# Patient Record
(demographics unavailable — no encounter records)

---

## 2019-11-04 NOTE — PHYS DOC
Past Medical History


Past Medical History:  Bipolar, Migraines, UTI


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

10 years, PCOS,


Past Surgical History:  Other


Additional Past Surgical Histo:  Laparoscopy


Alcohol Use:  Heavy


Drug Use:  None





Adult General


Chief Complaint


Chief Complaint:  PAIN ON URINATION





HPI


HPI





Patient is a 21  year old female who presents with 3 days of bilateral flank 

pain. Patient rates her pain 8 out of 10. Patient states is her same exact 

symptoms that she always has when she has a urinary tract infection. She denies 

dysuria or vaginal discharge.





Review of Systems


Review of Systems








Musculoskeletal: Bilateral Flank back pain or joint pain []








All other systems were reviewed and found to be within normal limits, except as 

documented in this note.





Allergies


Allergies





Allergies








Coded Allergies Type Severity Reaction Last Updated Verified


 


  ibuprofen Allergy Intermediate  2/3/17 Yes


 


  sulfamethoxazole Allergy Intermediate  9/22/18 Yes


 


  trimethoprim Allergy Intermediate  9/22/18 Yes











Physical Exam


Physical Exam





Constitutional: Well developed, well nourished, no acute distress, non-toxic 

appearance. []


Cardiovascular:Heart rate regular rhythm, no murmur []


Lungs & Thorax:  Bilateral breath sounds clear to auscultation []


Abdomen: Bowel sounds normal, soft, no tenderness, no masses, no pulsatile 

masses. [] 


Skin: Warm, dry, no erythema, no rash. [] 


Back: No tenderness, Bilateral CVA tenderness. [] 


Neurologic: Alert and oriented X 3, normal motor function, normal sensory 

function, no focal deficits noted. []


Psychologic: Affect normal, judgement normal, mood normal. []





Current Patient Data


Vital Signs





                                   Vital Signs








  Date Time  Temp Pulse Resp B/P (MAP) Pulse Ox O2 Delivery O2 Flow Rate FiO2


 


11/4/19 17:21 98.6 78 16 157/74 (101) 98 Room Air  





 98.6       








Lab Values





                                Laboratory Tests








Test


 11/4/19


17:20 11/4/19


17:24


 


Urine Color Yellow   


 


Urine Clarity Cloudy   


 


Urine pH 6.0   


 


Urine Specific Gravity 1.025   


 


Urine Protein


 Negative mg/dL


(NEG-TRACE) 





 


Urine Glucose (UA)


 Negative mg/dL


(NEG) 





 


Urine Ketones (Stick)


 Negative mg/dL


(NEG) 





 


Urine Blood


 Negative (NEG)


 





 


Urine Nitrite


 Negative (NEG)


 





 


Urine Bilirubin


 Negative (NEG)


 





 


Urine Urobilinogen Dipstick


 0.2 mg/dL (0.2


mg/dL) 





 


Urine Leukocyte Esterase


 Moderate (NEG)


 





 


Urine RBC 0 /HPF (0-2)   


 


Urine WBC


 11-20 /HPF


(0-4) 





 


Urine Squamous Epithelial


Cells Occ /LPF  


 





 


Urine Amorphous Sediment Present /HPF   


 


Urine Bacteria


 Moderate /HPF


(0-FEW) 





 


Urine Mucus Mod /LPF   


 


POC Urine HCG, Qualitative


 


 Hcg negative


(Negative)











EKG


EKG


[]





Radiology/Procedures


Radiology/Procedures


[]





Course & Med Decision Making


Course & Med Decision Making


CVA tenderness bilaterally. Afebrile. Denies abdominal pain, nausea, vomiting, 

diarrhea, fever, dizziness, headache, hematuria, vaginal discharge, concerns for

 sexual transmitted disease. Nothing makes the pain worse or better. Patient 

states she is not taking anything to help with the pain. Alert and oriented. 

Abdomen soft and nontender. Skin pink warm and dry. Speaks in full clear 

sentences. Lungs are clear to auscultation all lobes.





Dragon Disclaimer


Dragon Disclaimer


This electronic medical record was generated, in whole or in part, using a voice

 recognition dictation system.





Departure


Departure


Impression:  


   Primary Impression:  


   UTI (urinary tract infection)


Disposition:  01 HOME, SELF-CARE


Condition:  STABLE


Referrals:  


NO PCP (PCP)


Patient Instructions:  Urinary Tract Infection





Additional Instructions:  


FOLLOW UP WITH PRIMARY CARE. TAKE MEDICATIONS AS PRESCRIBED. DRINK PLENTY OF 

FLUIDS.


Scripts


Cephalexin (KEFLEX) 500 Mg Capsule


1 CAP PO BID for 7 Days, #14 CAP 0 Refills


   Prov: CARLOS ALLAN         11/4/19





Problem Qualifiers








   Primary Impression:  


   UTI (urinary tract infection)


   Urinary tract infection type:  site unspecified  Hematuria presence:  without

    hematuria  Qualified Codes:  N39.0 - Urinary tract infection, site not 

   specified








CARLOS ALLAN             Nov 4, 2019 17:43

## 2019-11-22 NOTE — RAD
CHEST PA   LATERAL

 

History: Cough, fever. 

 

Comparison: Two-view chest October 30, 2017.

 

Findings: 

The cardiomediastinal silhouette is normal. Pulmonary vasculature is 

normal. Small linear opacity in the right middle lobe may be discoid 

atelectasis or scarring. The lungs are otherwise clear. No pleural 

effusion or pneumothorax is seen. There is no acute bone abnormality. 

 

IMPRESSION: 

No acute cardiopulmonary process. 

 

 

Electronically signed by: Chavez De Souza MD (11/22/2019 1:30 PM) [id number]

## 2019-11-22 NOTE — PHYS DOC
Past Medical History


Past Medical History:  Other


Additional Past Medical Histor:  POLYCYCTIC OVARIAN SYNDROM


Past Surgical History:  Other


Additional Past Surgical Histo:  Laparoscopy


Alcohol Use:  None


Drug Use:  None





Adult General


Chief Complaint


Chief Complaint:  Congestion





HPI


HPI





Patient is a 21  year old female who presents with multiple complaints. The 

patient's been having a cough, congestion, runny nose, fever of unknown 

severity, for 1 week. She rates her pain as 8 out of 10 in severity and sharp. 

The patient denies taking medications for this at home.





Review of Systems


Review of Systems





Constitutional: Reports fever or chills []


Eyes: Denies change in visual acuity, redness, or eye pain []


HENT: Reports nasal congestion and sore throat []


Respiratory: Reports cough.


Cardiovascular: No additional information not addressed in HPI []


GI: Reports abdominal pain, nausea, Denies vomiting, bloody stools or diarrhea 

[]


: Denies dysuria or hematuria []


Musculoskeletal: Denies back pain or joint pain []


Integument: Denies rash or skin lesions []


Neurologic: Denies headache, focal weakness or sensory changes []


Endocrine: Denies polyuria or polydipsia []





Complete systems were reviewed and found to be within normal limits, except as 

documented in this note.





Allergies


Allergies





Allergies








Coded Allergies Type Severity Reaction Last Updated Verified


 


  ibuprofen Allergy Intermediate  2/3/17 Yes


 


  sulfamethoxazole Allergy Intermediate  18 Yes


 


  trimethoprim Allergy Intermediate  18 Yes











Physical Exam


Physical Exam





Constitutional: Well developed, well nourished, no acute distress, non-toxic 

appearance. []


HENT: Normocephalic, atraumatic, bilateral external ears normal, oropharynx 

moist, no oral exudates, nose normal. []


Eyes: PERRLA, EOMI, conjunctiva normal, no discharge. [] 


Neck: Normal range of motion, no tenderness, supple, no stridor. [] 


Cardiovascular:Heart rate regular rhythm, no murmur []


Lungs & Thorax:  Bilateral breath sounds clear to auscultation []


Abdomen: Bowel sounds normal, soft, no tenderness, no masses, no pulsatile rea

s. [] 


Skin: Warm, dry, no erythema, no rash. [] 


Back: No tenderness, no CVA tenderness. [] 


Extremities: No tenderness, no cyanosis, no clubbing, ROM intact, no edema. [] 


Neurologic: Alert and oriented X 3, normal motor function, normal sensory 

function, no focal deficits noted. []


Psychologic: Affect normal, judgement normal, mood normal. []





Current Patient Data


Vital Signs





                                   Vital Signs








  Date Time  Temp Pulse Resp B/P (MAP) Pulse Ox O2 Delivery O2 Flow Rate FiO2


 


19 12:34 99.0 80 20 114/69 (84) 97 Room Air  





 99.0       








Lab Values





                                Laboratory Tests








Test


 19


12:50 19


12:51 19


12:55


 


Urine Collection Type Unknown    


 


Urine Color Yellow    


 


Urine Clarity Clear    


 


Urine pH 5.5    


 


Urine Specific Gravity 1.025    


 


Urine Protein


 Negative mg/dL


(NEG-TRACE) 


 





 


Urine Glucose (UA)


 Negative mg/dL


(NEG) 


 





 


Urine Ketones (Stick)


 Negative mg/dL


(NEG) 


 





 


Urine Blood


 Moderate (NEG)


 


 





 


Urine Nitrite


 Negative (NEG)


 


 





 


Urine Bilirubin


 Negative (NEG)


 


 





 


Urine Urobilinogen Dipstick


 0.2 mg/dL (0.2


mg/dL) 


 





 


Urine Leukocyte Esterase


 Moderate (NEG)


 


 





 


Urine RBC


 Rare /HPF


(0-2) 


 





 


Urine WBC


 5-10 /HPF


(0-4) 


 





 


Urine Squamous Epithelial


Cells Many /LPF  


 


 





 


Urine Bacteria


 Many /HPF


(0-FEW) 


 





 


Urine Mucus Marked /LPF    


 


POC Urine HCG, Qualitative


 


 Hcg negative


(Negative) 





 


Influenza Type A Antigen


 


 


 Negative


(NEGATIVE)


 


Influenza Type B Antigen


 


 


 Negative


(NEGATIVE)











EKG


EKG


[]





Radiology/Procedures


Radiology/Procedures


[][facility]


                    [address] [place] [address]


                                 [phone]


                                        


                                 IMAGING REPORT





                                     Signed





PATIENT: REEMA ALMODOVAR     ACCOUNT: [id number]     MRN#: [id number]


: 1998           LOCATION: ER              AGE: 21


SEX: F                    EXAM DT: 19         ACCESSION#: [id number]


STATUS: REG ER            ORD. PHYSICIAN: BAUTISTA WEST


REASON: cough, fever


PROCEDURE: CHEST PA & LATERAL





CHEST PA   LATERAL


 


History: Cough, fever. 


 


Comparison: Two-view chest 2017.


 


Findings: 


The cardiomediastinal silhouette is normal. Pulmonary vasculature is 


normal. Small linear opacity in the right middle lobe may be discoid 


atelectasis or scarring. The lungs are otherwise clear. No pleural 


effusion or pneumothorax is seen. There is no acute bone abnormality. 


 


IMPRESSION: 


No acute cardiopulmonary process. 


 


 


Electronically signed by: Chavez Jacobson MD (2019 1:30 PM) [id number]














DICTATED and SIGNED BY:     CHAVEZ JACOBSON MD


DATE:     19 6610





Course & Med Decision Making


Course & Med Decision Making


Pertinent Labs and Imaging studies reviewed. (See chart for details)





Will get chest xray, ua, preg, and flu.





Flu is negative, chest x-ray is unremarkable.





Dragon Disclaimer


Dragon Disclaimer


This electronic medical record was generated, in whole or in part, using a voice

 recognition dictation system.





Departure


Departure


Impression:  


   Primary Impression:  


   UTI (urinary tract infection)


   Additional Impression:  


   Upper respiratory infection, viral


Disposition:   HOME, SELF-CARE


Condition:  STABLE


Referrals:  


NO PCP (PCP)


Patient Instructions:  Upper Respiratory Infection, Adult, Urinary Tract 

Infection





Additional Instructions:  


Thank you for visiting [facility]. We appreciate you trusting us 

with your care. If any additional problems come up don't hesitate to return to 

visit us. Please follow up with your primary care provider so they can plan 

additional care if needed and know about the problem that you had. If symptoms 

worsen come back to the Emergency Department. Any concerning symptoms that start

 such as chest pain, shortness of air, weakness or numbness on one side of the 

body, running high fevers or any other concerning symptoms return to the ER.





You have been prescribed an antibiotic today to help fight your infection. 

Please take all of the antibiotic as directed. If after 48 hours the infection 

is not improving, please return for more care. If the infection worsens, return 

to ER for additional care.


Scripts


Cephalexin (KEFLEX) 500 Mg Capsule


1 CAP PO BID for 7 Days, #14 CAP 0 Refills


   Prov: BAUTISTA WEST         19





Problem Qualifiers








   Primary Impression:  


   UTI (urinary tract infection)


   Urinary tract infection type:  acute cystitis  Hematuria presence:  without 

   hematuria  Qualified Codes:  N30.00 - Acute cystitis without hematuria








BAUTISTA WEST          2019 13:02

## 2020-07-02 NOTE — PHYS DOC
Past Medical History


Past Medical History:  Other


Additional Past Medical Histor:  POLYCYCTIC OVARIAN SYNDROM


Past Surgical History:  Other


Additional Past Surgical Histo:  Laparoscopy


Smoking Status:  Never Smoker


Alcohol Use:  None


Drug Use:  None





General Adult


EDM:


Chief Complaint:  ABDOMINAL PAIN





HPI:


HPI:





22-year-old female presents with a chief complaint of right lower quadrant 

abdominal pain.  Patient states abdominal pain was present upon awakening at 

noon.  Patient states pain is in the right lower quadrant and it does not 

radiate.  Patient states she has associated nausea but has not vomited.  Patient

denies any diarrhea she denies any urinary symptoms.





Review of Systems:


Review of Systems:


Constitutional:   Denies fever or chills. []


Eyes:   Denies change in visual acuity. []


HENT:   Denies nasal congestion or sore throat. [] 


Respiratory:   Denies cough or shortness of breath. [] 


Cardiovascular:   Denies chest pain or edema. [] 


GI:   Positive abdominal pain positive nausea


:  Denies dysuria. [] 


Musculoskeletal:   Denies back pain or joint pain. [] 


Integument:   Denies rash. [] 


Neurologic:   Denies headache, focal weakness or sensory changes. [] 


Endocrine:   Denies polyuria or polydipsia. [] 


Lymphatic:  Denies swollen glands. [] 


Psychiatric:  Denies depression or anxiety. []





Heart Score:


Risk Factors:


Risk Factors:  DM, Current or recent (<one month) smoker, HTN, HLP, family 

history of CAD, obesity.


Risk Scores:


Score 0 - 3:  2.5% MACE over next 6 weeks - Discharge Home


Score 4 - 6:  20.3% MACE over next 6 weeks - Admit for Clinical Observation


Score 7 - 10:  72.7% MACE over next 6 weeks - Early Invasive Strategies





Allergies:


Allergies:





Allergies








Coded Allergies Type Severity Reaction Last Updated Verified


 


  ibuprofen Allergy Intermediate  2/3/17 Yes


 


  sulfamethoxazole Allergy Intermediate  9/22/18 Yes


 


  trimethoprim Allergy Intermediate  9/22/18 Yes











Physical Exam:


PE:





Constitutional: Well developed, well nourished, no acute distress, non-toxic 

appearance. []


HENT: Normocephalic, atraumatic, bilateral external ears normal, oropharynx 

moist, no oral exudates, nose normal. []


Eyes: EOMI, conjunctiva normal, no discharge. [] 


Neck: Normal range of motion, no tenderness, supple, no stridor. [] 


Cardiovascular:Heart rate regular rhythm, no murmur []


Lungs & Thorax: No respiratory distress


Abdomen: Tenderness to palpation right lower quadrant


Skin: Warm, dry, no erythema, no rash. [] 


Back: No tenderness, no CVA tenderness. [] 


Extremities: No tenderness, no cyanosis, no clubbing, ROM intact, no edema. [] 


Neurologic: Alert and oriented X 3, normal motor function, normal sensory 

function, no focal deficits noted. []


Psychologic: Affect normal, judgement normal, mood normal. []





Current Patient Data:


Labs:





                                Laboratory Tests








Test


 7/2/20


01:05 7/2/20


01:15 7/2/20


01:26


 


Urine Collection Type Unknown    


 


Urine Color Yellow    


 


Urine Clarity Clear    


 


Urine pH


 5.5 (<5.0-8.0)


 


 





 


Urine Specific Gravity


 1.020


(1.000-1.030) 


 





 


Urine Protein


 Negative mg/dL


(NEG-TRACE) 


 





 


Urine Glucose (UA)


 Negative mg/dL


(NEG) 


 





 


Urine Ketones (Stick)


 Negative mg/dL


(NEG) 


 





 


Urine Blood


 Negative (NEG)


 


 





 


Urine Nitrite


 Negative (NEG)


 


 





 


Urine Bilirubin


 Negative (NEG)


 


 





 


Urine Urobilinogen Dipstick


 0.2 mg/dL (0.2


mg/dL) 


 





 


Urine Leukocyte Esterase Trace (NEG)    


 


Urine RBC 0 /HPF (0-2)    


 


Urine WBC


 5-10 /HPF


(0-4) 


 





 


Urine Squamous Epithelial


Cells Mod /LPF  


 


 





 


Urine Bacteria


 Few /HPF


(0-FEW) 


 





 


Urine Mucus Slight /LPF    


 


White Blood Count


 


 8.9 x10^3/uL


(4.0-11.0) 





 


Red Blood Count


 


 4.04 x10^6/uL


(3.50-5.40) 





 


Hemoglobin


 


 12.3 g/dL


(12.0-15.5) 





 


Hematocrit


 


 36.5 %


(36.0-47.0) 





 


Mean Corpuscular Volume


 


 90 fL ()


 





 


Mean Corpuscular Hemoglobin  30 pg (25-35)   


 


Mean Corpuscular Hemoglobin


Concent 


 34 g/dL


(31-37) 





 


Red Cell Distribution Width


 


 13.8 %


(11.5-14.5) 





 


Platelet Count


 


 308 x10^3/uL


(140-400) 





 


Neutrophils (%) (Auto)  66 % (31-73)   


 


Lymphocytes (%) (Auto)  25 % (24-48)   


 


Monocytes (%) (Auto)  6 % (0-9)   


 


Eosinophils (%) (Auto)  2 % (0-3)   


 


Basophils (%) (Auto)  1 % (0-3)   


 


Neutrophils # (Auto)


 


 5.9 x10^3/uL


(1.8-7.7) 





 


Lymphocytes # (Auto)


 


 2.2 x10^3/uL


(1.0-4.8) 





 


Monocytes # (Auto)


 


 0.6 x10^3/uL


(0.0-1.1) 





 


Eosinophils # (Auto)


 


 0.2 x10^3/uL


(0.0-0.7) 





 


Basophils # (Auto)


 


 0.1 x10^3/uL


(0.0-0.2) 





 


Sodium Level


 


 140 mmol/L


(136-145) 





 


Potassium Level


 


 4.4 mmol/L


(3.5-5.1) 





 


Chloride Level


 


 104 mmol/L


() 





 


Carbon Dioxide Level


 


 28 mmol/L


(21-32) 





 


Anion Gap  8 (6-14)   


 


Blood Urea Nitrogen


 


 16 mg/dL


(7-20) 





 


Creatinine


 


 0.9 mg/dL


(0.6-1.0) 





 


Estimated GFR


(Cockcroft-Gault) 


 78.3  


 





 


BUN/Creatinine Ratio  18 (6-20)   


 


Glucose Level


 


 126 mg/dL


(70-99)  H 





 


Calcium Level


 


 9.0 mg/dL


(8.5-10.1) 





 


Total Bilirubin


 


 0.1 mg/dL


(0.2-1.0)  L 





 


Aspartate Amino Transferase


(AST) 


 25 U/L (15-37)


 





 


Alanine Aminotransferase (ALT)


 


 43 U/L (14-59)


 





 


Alkaline Phosphatase


 


 92 U/L


() 





 


Total Protein


 


 7.0 g/dL


(6.4-8.2) 





 


Albumin


 


 3.1 g/dL


(3.4-5.0)  L 





 


Albumin/Globulin Ratio


 


 0.8 (1.0-1.7)


L 





 


POC Urine HCG, Qualitative


 


 


 Hcg negative


(Negative)





                                Laboratory Tests


7/2/20 01:15








                                Laboratory Tests


7/2/20 01:15








Vital Signs:





                                   Vital Signs








  Date Time  Temp Pulse Resp B/P (MAP) Pulse Ox O2 Delivery O2 Flow Rate FiO2


 


7/2/20 01:11 98.7 79 25 126/61 (82) 99 Room Air  





 98.7       











EKG:


EKG:


[]





Radiology/Procedures:


Radiology/Procedures:


[]


Impression:


CT abdomen pelvis with contrast.


 


HISTORY: Abdominal pain


 


CT scan the abdomen pelvis was done using 75 mL Omnipaque 300 contrast. 


Lung bases are clear. There is no effusion. There is fatty change in the 


liver without a focal liver lesion. Patient's size creates artifact. 


Spleen and adrenal glands are normal. There is no pancreatic lesion. There


is no mass or hydronephrosis in the kidneys. There is vena cava on each 


side of the aorta from the left renal vein to the iliac vessels, this is a


normal variation. Bowel pattern is normal without obstruction. Appendix is


normal. There is a uterine mass, leiomyomas possible ultrasound could be 


of benefit. Ovaries are normal. There is no free fluid in the pelvis. 


There is no small bowel obstruction. There is spondylolysis at L5 without 


spondylolisthesis.


 


IMPRESSION:


1. Uterine lesion possible leiomyoma ultrasound could be of benefit for 


better characterization.


2. Fatty change in the liver.


3. No other abdominal or pelvic mass noted.


4. Normal appendix





Course & Med Decision Making:


Course & Med Decision Making


Pertinent Labs and Imaging studies reviewed. (See chart for details)





[] Patient was evaluated for chief complaint.  Work-up consisted of laboratory 

analysis radiologic imaging.  Results reviewed and discussed with patient.  CT 

abdomen no acute appendicitis.  There was a uterine abnormality found ultrasound

 recommended.  On reexamination patient had no uterine or pelvic pain.  

Patient's pain is clearly in the abdomen.  Patient informed of these results and

 advised to follow-up with her OB/GYN.  Patient was discharged home on Ultram





Dragon Disclaimer:


Dragon Disclaimer:


This electronic medical record was generated, in whole or in part, using a voice

 recognition dictation system.





Departure


Departure


Impression:  


   Primary Impression:  


   Abdominal pain


Disposition:  01 HOME, SELF-CARE


Condition:  STABLE


Referrals:  


NO PCP (PCP)


Scripts


Tramadol Hcl (ULTRAM) 50 Mg Tablet


1 TAB PO PRN Q6HRS PRN for pain MDD 4 Tablet(s) for 7 Days, #20 TAB 0 Refills


   Prov: FABY DESOUZA DO         7/2/20





Justicifation of Admission Dx:


Justifications for Admission:


Justification of Admission Dx:  N/A











FABY DESOUZA DO             Jul 2, 2020 01:50

## 2020-07-02 NOTE — RAD
CT abdomen pelvis with contrast.

 

HISTORY: Abdominal pain

 

CT scan the abdomen pelvis was done using 75 mL Omnipaque 300 contrast. 

Lung bases are clear. There is no effusion. There is fatty change in the 

liver without a focal liver lesion. Patient's size creates artifact. 

Spleen and adrenal glands are normal. There is no pancreatic lesion. There

is no mass or hydronephrosis in the kidneys. There is vena cava on each 

side of the aorta from the left renal vein to the iliac vessels, this is a

normal variation. Bowel pattern is normal without obstruction. Appendix is

normal. There is a uterine mass, leiomyomas possible ultrasound could be 

of benefit. Ovaries are normal. There is no free fluid in the pelvis. 

There is no small bowel obstruction. There is spondylolysis at L5 without 

spondylolisthesis.

 

IMPRESSION:

1. Uterine lesion possible leiomyoma ultrasound could be of benefit for 

better characterization.

2. Fatty change in the liver.

3. No other abdominal or pelvic mass noted.

4. Normal appendix

 

 

 

 

 

 

 

 

 

PQRS Compliance Statement:

 

One or more of the following individualized dose reduction techniques were

utilized for this examination:  

1. Automated exposure control  

2. Adjustment of the mA and/or kV according to patient size  

3. Use of iterative reconstruction technique

 

Electronically signed by: Yaniv Jenkins MD (7/2/2020 3:02 AM) [facility]
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
## 2021-02-12 NOTE — RAD
Exam: Chest 2 views



INDICATION: Cough



TECHNIQUE: Frontal and lateral views the chest



Comparisons: 11/22/2019



FINDINGS:

The cardiomediastinal silhouette and pulmonary vessels are within normal limits.



The lung and pleural spaces are clear.



IMPRESSION:

No acute cardiopulmonary process.



Electronically signed by: Deana Price MD (2/12/2021 10:26 PM) [facility]
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
## 2021-05-27 NOTE — ED.ADGEN
Past Medical History


Past Medical History:  Anxiety, Other


Additional Past Medical Histor:  POLYCYCTIC OVARIAN SYNDROM, CUSHINGS SYNDROME, 

ADHD, PTSD, FIBROIDS


Past Surgical History:  Other


Additional Past Surgical Histo:  D & C, MYOMECTOMY


Smoking Status:  Never Smoker


Alcohol Use:  None


Drug Use:  None





General Adult


EDM:


Chief Complaint:  LOWER EXTREMITY SWELLING





HPI:


HPI:


Patient is a 23-year-old female who presents to the emergency room complaining 

of bilateral lower extremity swelling.  She states that this started in her feet

and is progressively worked up her legs.  She states it is painful to walk due 

to the swelling.  She states it feels like a throbbing type pain.  It is 

constant in nature.  Worse with pressure.  She has noticed some mild erythema.  

She denies any shortness of breath or chest pain.  She has never had anything 

like this before.





Review of Systems:


Review of Systems:


Complete ROS is negative unless otherwise documented in HPI





Current Medications:





Current Medications








 Medications


  (Trade)  Dose


 Ordered  Sig/Georgie  Start Time


 Stop Time Status Last Admin


Dose Admin


 


 Furosemide


  (Lasix)  40 mg  1X  ONCE  5/27/21 04:30


 5/27/21 04:32 DC 5/27/21 04:34


40 MG











Allergies:


Allergies:





Allergies








Coded Allergies Type Severity Reaction Last Updated Verified


 


  ibuprofen Allergy Intermediate  2/3/17 Yes


 


  sulfamethoxazole Allergy Intermediate  9/22/18 Yes


 


  trimethoprim Allergy Intermediate  9/22/18 Yes











Physical Exam:


PE:


General: Awake, alert, NAD. Well Nourished, well hydrated. Cooperative


HEENT: Atraumatic, EOMI, PERRL, airway patent, moist oral mucosa


Neck: Supple, trachea midline


Respiratory: CTA bilaterally, normal effort, no wheezing/crackles


CV: RRR, no murmur, cap refill <2, 2+ pitting edema to feet, ankles, mid calf 


GI: Soft, nondistended, nontender, no masses


MSK: No obvious deformities


Skin: Warm, dry, intact


Neuro: A&O x3, speech NL, sensory and motor grossly intact, no focal deficits


Psych: Normal affect, normal mood, not suicidal or homicidal





Current Patient Data:


Labs:





                                Laboratory Tests








Test


 5/27/21


01:23 5/27/21


02:00 5/27/21


02:15


 


POC Urine HCG, Qualitative


 Hcg negative


(Negative) 


 





 


Urine Collection Type  Unknown   


 


Urine Color  Yellow   


 


Urine Clarity  Clear   


 


Urine pH


 


 6.0 (<5.0-8.0)


 





 


Urine Specific Gravity


 


 >=1.030


(1.000-1.030) 





 


Urine Protein


 


 Negative mg/dL


(NEG-TRACE) 





 


Urine Glucose (UA)


 


 Negative mg/dL


(NEG) 





 


Urine Ketones (Stick)


 


 Negative mg/dL


(NEG) 





 


Urine Blood


 


 Negative (NEG)


 





 


Urine Nitrite


 


 Negative (NEG)


 





 


Urine Bilirubin


 


 Negative (NEG)


 





 


Urine Urobilinogen Dipstick


 


 0.2 mg/dL (0.2


mg/dL) 





 


Urine Leukocyte Esterase  Small (NEG)   


 


Urine RBC  0 /HPF (0-2)   


 


Urine WBC


 


 5-10 /HPF


(0-4) 





 


Urine Squamous Epithelial


Cells 


 Mod /LPF  


 





 


Urine Bacteria


 


 Few /HPF


(0-FEW) 





 


Urine Mucus  Mod /LPF   


 


White Blood Count


 


 


 7.3 x10^3/uL


(4.0-11.0)


 


Red Blood Count


 


 


 4.01 x10^6/uL


(3.50-5.40)


 


Hemoglobin


 


 


 12.0 g/dL


(12.0-15.5)


 


Hematocrit


 


 


 36.2 %


(36.0-47.0)


 


Mean Corpuscular Volume


 


 


 90 fL ()





 


Mean Corpuscular Hemoglobin   30 pg (25-35)  


 


Mean Corpuscular Hemoglobin


Concent 


 


 33 g/dL


(31-37)


 


Red Cell Distribution Width


 


 


 14.5 %


(11.5-14.5)


 


Platelet Count


 


 


 320 x10^3/uL


(140-400)


 


Neutrophils (%) (Auto)   61 % (31-73)  


 


Lymphocytes (%) (Auto)   29 % (24-48)  


 


Monocytes (%) (Auto)   7 % (0-9)  


 


Eosinophils (%) (Auto)   3 % (0-3)  


 


Basophils (%) (Auto)   1 % (0-3)  


 


Neutrophils # (Auto)


 


 


 4.4 x10^3/uL


(1.8-7.7)


 


Lymphocytes # (Auto)


 


 


 2.1 x10^3/uL


(1.0-4.8)


 


Monocytes # (Auto)


 


 


 0.5 x10^3/uL


(0.0-1.1)


 


Eosinophils # (Auto)


 


 


 0.2 x10^3/uL


(0.0-0.7)


 


Basophils # (Auto)


 


 


 0.0 x10^3/uL


(0.0-0.2)


 


Sodium Level


 


 


 144 mmol/L


(136-145)


 


Potassium Level


 


 


 4.2 mmol/L


(3.5-5.1)


 


Chloride Level


 


 


 103 mmol/L


()


 


Carbon Dioxide Level


 


 


 34 mmol/L


(21-32)  H


 


Anion Gap   7 (6-14)  


 


Blood Urea Nitrogen


 


 


 16 mg/dL


(7-20)


 


Creatinine


 


 


 0.9 mg/dL


(0.6-1.0)


 


Estimated GFR


(Cockcroft-Gault) 


 


 77.6  





 


BUN/Creatinine Ratio   18 (6-20)  


 


Glucose Level


 


 


 118 mg/dL


(70-99)  H


 


Calcium Level


 


 


 8.5 mg/dL


(8.5-10.1)


 


Total Bilirubin


 


 


 0.3 mg/dL


(0.2-1.0)


 


Aspartate Amino Transferase


(AST) 


 


 23 U/L (15-37)





 


Alanine Aminotransferase (ALT)


 


 


 45 U/L (14-59)





 


Alkaline Phosphatase


 


 


 96 U/L


()


 


Total Protein


 


 


 7.4 g/dL


(6.4-8.2)


 


Albumin


 


 


 3.4 g/dL


(3.4-5.0)


 


Albumin/Globulin Ratio


 


 


 0.9 (1.0-1.7)


L





                                Laboratory Tests


5/27/21 02:15








                                Laboratory Tests


5/27/21 02:15











Vital Signs:





                                   Vital Signs








  Date Time  Temp Pulse Resp B/P (MAP) Pulse Ox O2 Delivery O2 Flow Rate FiO2


 


5/27/21 04:27  82  125/58 (80) 96 Room Air  


 


5/27/21 01:25 98.4  16     





 98.4       











EKG:


EKG:


[]





Heart Score:


C/O Chest Pain:  N/A


Risk Factors:


Risk Factors:  DM, Current or recent (<one month) smoker, HTN, HLP, family 

history of CAD, obesity.


Risk Scores:


Score 0 - 3:  2.5% MACE over next 6 weeks - Discharge Home


Score 4 - 6:  20.3% MACE over next 6 weeks - Admit for Clinical Observation


Score 7 - 10:  72.7% MACE over next 6 weeks - Early Invasive Strategies





Radiology/Procedures:


Radiology/Procedures:


[]





Course & Med Decision Making:


Course & Med Decision Making


Pertinent Labs and Imaging studies reviewed. (See chart for details)





Patient is 23-year-old female who presents to the emergency room complaining of 

bilateral pitting edema.  Patient has no other complaints.  She does have some 

erythema and this could be secondary to cellulitis.  Bilateral venous Dopplers 

will be done to rule out DVT.  BNP, creatinine, liver enzymes are normal.  

Patient does not appear to have another cause of peripheral edema.  Patient will

 be placed on antibiotics.  She will be placed in compression stockings.  She 

will be given a single dose of Lasix.  She will follow up with primary care.  

Patient's test results and vitals while in the ED were fully reviewed and 

discussed with the patient. Patient is stable and at this time does not need 

admission to the hospital. We have discussed strict return precautions and the 

importance of following up with their Primary Care Physician. Patient stated 

understanding and was given an opportunity to ask any questions. Patient is in 

agreement with plan.





Dragon Disclaimer:


Dragon Disclaimer:


This electronic medical record was generated, in whole or in part, using a voice

 recognition dictation system.





Departure


Departure


Impression:  


   Primary Impression:  


   Peripheral edema


   Additional Impression:  


   Cellulitis


Disposition:  01 HOME / SELF CARE / HOMELESS


Condition:  STABLE


Referrals:  


UNKNOWN PCP NAME (PCP)


Patient Instructions:  Peripheral Edema


Scripts


Compression Socks, Large (Lifestylecomfort Socks) 1 Each Each


EACH , #1


   Prov: MICKEY SOLOMON MD         5/27/21 


Amoxicillin/Potassium Clav (AUGMENTIN 875-125 TABLET) 1 Each Tablet


1 TAB PO Q12HR for 5 Days, #10 TAB


   Prov: MICKEY SOLOMON MD         5/27/21





Problem Qualifiers











MICKEY SOLOMON MD            May 27, 2021 03:54
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
## 2021-07-05 NOTE — ED.ADGEN
Past Medical History


Past Medical History:  Anxiety, Other


Additional Past Medical Histor:  POLYCYCTIC OVARIAN SYNDROM, CUSHINGS SYNDROME, 

ADHD, PTSD, FIBROIDS


Past Surgical History:  Other


Additional Past Surgical Histo:  D & C, MYOMECTOMY


Smoking Status:  Never Smoker


Alcohol Use:  None


Drug Use:  None





General Adult


EDM:


Chief Complaint:  MULTIPLE COMPLAINTS





HPI:


HPI:


Patient is a 23-year-old female with multiple complaints.  Patient states that 

she generally feels unwell and run down.  She denies any cough, vomiting, 

abdominal pain, chest pain, fever, chills, sweats, dysuria.  She does state that

she has had some vaginal bleeding.  She does not typically get menstrual cycles 

due to her medical history.  She is concerned that maybe she has another fibroid

and is requesting an ultrasound.





Review of Systems:


Review of Systems:


Complete ROS is negative unless otherwise documented in HPI





Current Medications:





Current Medications








 Medications


  (Trade)  Dose


 Ordered  Sig/Georgie  Start Time


 Stop Time Status Last Admin


Dose Admin


 


 Dexamethasone


 Sodium Phosphate


  (Decadron)  10 mg  1X  ONCE  7/5/21 03:00


 7/5/21 03:01 DC 7/5/21 04:19


10 MG


 


 Diphenhydramine


 HCl


  (Benadryl)  25 mg  1X  ONCE  7/5/21 03:00


 7/5/21 03:01 DC 7/5/21 04:20


25 MG


 


 Furosemide


  (Lasix)  40 mg  1X  ONCE  7/5/21 03:00


 7/5/21 03:01 DC 7/5/21 04:20


40 MG


 


 Prochlorperazine


 Edisylate


  (Compazine)  10 mg  1X  ONCE  7/5/21 03:00


 7/5/21 03:01 DC 7/5/21 04:19


10 MG


 


 Sodium Chloride  1,000 ml @ 


 30 mls/hr  Q24H  7/5/21 02:45


 7/5/21 04:52 DC  














Allergies:


Allergies:





Allergies








Coded Allergies Type Severity Reaction Last Updated Verified


 


  ibuprofen Allergy Intermediate  2/3/17 Yes


 


  sulfamethoxazole Allergy Intermediate  9/22/18 Yes


 


  trimethoprim Allergy Intermediate  9/22/18 Yes











Physical Exam:


PE:


General: Awake, alert, NAD. Well Nourished, well hydrated. Cooperative, morbid o

besity


HEENT: Atraumatic, EOMI, PERRL, airway patent, moist oral mucosa


Neck: Supple, trachea midline


Respiratory: CTA bilaterally, normal effort, no wheezing/crackles


CV: RRR, no murmur, cap refill <2


GI: Soft, nondistended, nontender, no masses


MSK: No obvious deformities


Skin: Warm, dry, intact


Neuro: A&O x3, speech NL, sensory and motor grossly intact, no focal deficits


Psych: Normal affect, normal mood, not suicidal or homicidal





Current Patient Data:


Labs:





                                Laboratory Tests








Test


 7/5/21


02:05 7/5/21


02:12 7/5/21


03:40


 


Urine Color Yellow    


 


Urine Clarity Clear    


 


Urine pH


 5.5 (<5.0-8.0)


 


 





 


Urine Specific Gravity


 1.020


(1.000-1.030) 


 





 


Urine Protein


 Negative mg/dL


(NEG-TRACE) 


 





 


Urine Glucose (UA)


 Negative mg/dL


(NEG) 


 





 


Urine Ketones (Stick)


 Negative mg/dL


(NEG) 


 





 


Urine Blood Large (NEG)    


 


Urine Nitrite


 Negative (NEG)


 


 





 


Urine Bilirubin Small (NEG)    


 


Urine Urobilinogen Dipstick


 0.2 mg/dL (0.2


mg/dL) 


 





 


Urine Leukocyte Esterase Small (NEG)    


 


Urine RBC


 Occ /HPF (0-2)


 


 





 


Urine WBC


 11-20 /HPF


(0-4) 


 





 


Urine Squamous Epithelial


Cells Many /LPF  


 


 





 


Urine Bacteria


 Few /HPF


(0-FEW) 


 





 


Urine Mucus Mod /LPF    


 


POC Urine HCG, Qualitative


 


 Hcg negative


(Negative) 





 


White Blood Count


 


 


 7.8 x10^3/uL


(4.0-11.0)


 


Red Blood Count


 


 


 4.13 x10^6/uL


(3.50-5.40)


 


Hemoglobin


 


 


 12.7 g/dL


(12.0-15.5)


 


Hematocrit


 


 


 37.6 %


(36.0-47.0)


 


Mean Corpuscular Volume


 


 


 91 fL ()





 


Mean Corpuscular Hemoglobin   31 pg (25-35)  


 


Mean Corpuscular Hemoglobin


Concent 


 


 34 g/dL


(31-37)


 


Red Cell Distribution Width


 


 


 14.7 %


(11.5-14.5)  H


 


Platelet Count


 


 


 287 x10^3/uL


(140-400)


 


Neutrophils (%) (Auto)   66 % (31-73)  


 


Lymphocytes (%) (Auto)   26 % (24-48)  


 


Monocytes (%) (Auto)   6 % (0-9)  


 


Eosinophils (%) (Auto)   1 % (0-3)  


 


Basophils (%) (Auto)   1 % (0-3)  


 


Neutrophils # (Auto)


 


 


 5.1 x10^3/uL


(1.8-7.7)


 


Lymphocytes # (Auto)


 


 


 2.1 x10^3/uL


(1.0-4.8)


 


Monocytes # (Auto)


 


 


 0.5 x10^3/uL


(0.0-1.1)


 


Eosinophils # (Auto)


 


 


 0.1 x10^3/uL


(0.0-0.7)


 


Basophils # (Auto)


 


 


 0.0 x10^3/uL


(0.0-0.2)


 


Sodium Level


 


 


 140 mmol/L


(136-145)


 


Potassium Level


 


 


 4.9 mmol/L


(3.5-5.1)


 


Chloride Level


 


 


 103 mmol/L


()


 


Carbon Dioxide Level


 


 


 28 mmol/L


(21-32)


 


Anion Gap   9 (6-14)  


 


Blood Urea Nitrogen


 


 


 15 mg/dL


(7-20)


 


Creatinine


 


 


 0.9 mg/dL


(0.6-1.0)


 


Estimated GFR


(Cockcroft-Gault) 


 


 77.6  





 


Glucose Level


 


 


 110 mg/dL


(70-99)  H


 


Calcium Level


 


 


 9.1 mg/dL


(8.5-10.1)





                                Laboratory Tests


7/5/21 03:40








                                Laboratory Tests


7/5/21 03:40











Microbiology


7/5/21 Urine Culture - Final, Complete


         





Vital Signs:





                                   Vital Signs








  Date Time  Temp Pulse Resp B/P (MAP) Pulse Ox O2 Delivery O2 Flow Rate FiO2


 


7/5/21 04:36  70 19 169/77 (107) 96 Room Air  


 


7/4/21 21:25 98.7       





 98.7       











EKG:


EKG:


[]





Heart Score:


C/O Chest Pain:  N/A


Risk Factors:


Risk Factors:  DM, Current or recent (<one month) smoker, HTN, HLP, family 

history of CAD, obesity.


Risk Scores:


Score 0 - 3:  2.5% MACE over next 6 weeks - Discharge Home


Score 4 - 6:  20.3% MACE over next 6 weeks - Admit for Clinical Observation


Score 7 - 10:  72.7% MACE over next 6 weeks - Early Invasive Strategies





Radiology/Procedures:


Radiology/Procedures:


[]





Course & Med Decision Making:


Course & Med Decision Making


Pertinent Labs and Imaging studies reviewed. (See chart for details)


Patient is a 23-year-old female who presents to the emergency room complaining 

of generally feeling unwell and vaginal bleeding.  Patient's pregnancy test is 

negative.  Of discussed with the patient that dysfunctional uterine bleeding is 

not a reason for an emergent ultrasound at this time.  I discussed with her that

 if she does have fibroids she should follow-up with her primary OB/GYN to have 

an ultrasound done outpatient.  Lab work was done to ensure patient does not 

have any significant anemia, urinary tract infection, kidney dysfunction, or 

electrolyte abnormalities.  Lab work is unremarkable.  Patient does have a 

headache which was treated with a migraine cocktail.  She has had migraines in 

the past.  Patient's test results and vitals while in the ED were fully reviewed

 and discussed with the patient. Patient is stable and at this time does not 

need admission to the hospital. We have discussed strict return precautions and 

the importance of following up with their Primary Care Physician. Patient stated

 understanding and was given an opportunity to ask any questions. Patient is in 

agreement with plan.





Dragon Disclaimer:


Dragon Disclaimer:


This electronic medical record was generated, in whole or in part, using a voice

 recognition dictation system.





Departure


Departure


Impression:  


   Primary Impression:  


   Migraine headache


   Additional Impression:  


   Dysfunctional uterine bleeding


Disposition:  01 HOME / SELF CARE / HOMELESS


Condition:  STABLE


Referrals:  


UNKNOWN PCP NAME (PCP)


Patient Instructions:  Migraine Headache, Uterine Bleeding, Dysfunctional





Problem Qualifiers











MICKEY SOLOMON MD             Jul 5, 2021 04:15

## 2022-03-09 NOTE — PHYS DOC
Past Medical History


Past Medical History:  Anxiety, Other


Additional Past Medical Histor:  PCOS, CUSHINGS SYNDROME, ADHD, PTSD, FIBROIDS


Past Surgical History:  Other


Additional Past Surgical Histo:  D & C, MYOMECTOMY


Smoking Status:  Never Smoker


Alcohol Use:  None


Drug Use:  None





General Adult


HPI:


HPI:





Patient is a 24 year old female who presents with right knee pain status post 

fall today at work.  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 

is very muddy and frequently icy.  Today, she slipped on the ice and fell onto 

her right knee.  She states that as she fell, her knee "twisted completely."  

She now states that it "margot" beneath her when she attempts to walk.  Patient

rates her pain 8/10 at rest and 10/10 when attempting to ambulate.  She took 600

mg ibuprofen at 1300 today without significant pain relief.  Patient denies any 

other injuries or other complaints at this time.





Review of Systems:


Review of Systems:


ROS negative or noncontributory except as mentioned in HPI.





Heart Score:


C/O Chest Pain:  No





Allergies:


Allergies:





Allergies








Coded Allergies Type Severity Reaction Last Updated Verified


 


  ibuprofen Allergy Intermediate  2/3/17 Yes


 


  sulfamethoxazole Allergy Intermediate  9/22/18 Yes


 


  trimethoprim Allergy Intermediate  9/22/18 Yes











Physical Exam:


PE:





Constitutional: Morbidly obese, somewhat disheveled, no acute distress, non-

toxic appearance. 


HENT: Normocephalic, atraumatic, bilateral external ears normal, nose normal. 


Eyes: EOMI, conjunctiva normal, no discharge.  


Neck: Normal range of motion, no stridor.  


Skin: Warm, dry, no erythema, no rash, no abrasion, no laceration.


Extremities: Right knee exquisitely tender to touch, passive range of motion 

intact though painfully, negative anterior and posterior drawer test, PT pulses 

2+ bilaterally.  Extremities otherwise no tenderness, no cyanosis, no clubbing, 

ROM intact, no edema. 


Neurologic: Alert and oriented x4, no focal deficits noted.





Current Patient Data:


Vital Signs:





Vital Signs








  Date Time  Temp Pulse Resp B/P (MAP) Pulse Ox O2 Delivery O2 Flow Rate FiO2


 


3/9/22 21:17   18  96 Room Air  


 


3/9/22 20:30  82 22 119/89 (99) 98 Room Air  


 


3/9/22 20:00 97.9 82 22 119/89 (99) 98 Room Air  





 97.9       











Radiology/Procedures:


Radiology/Procedures:


PROCEDURE: KNEE RIGHT 3V





EXAM: AP, lateral and oblique views of the right knee





DATE: 3/9/2022 8:32 PM





INDICATION: Reason: fall on ice today / Spl. Instructions:  / History:  





COMPARISON: No Prior





FINDINGS:


No acute fracture or dislocation. No joint effusion.  Joint spaces are preserved

without significant degenerative/proliferative change.





IMPRESSION:


No acute fracture or dislocation.





Electronically signed by: Ethan Reddy MD (3/9/2022 8:38 PM) [facility]





Course & Med Decision Making:


Course & Med Decision Making


Pertinent Labs and Imaging studies reviewed. (See chart for details)





Patient is a 24-year-old female who presents with right knee pain after twisting

her knee while falling.  Patient likely has a connective tissue injury, however 

plain films will be ordered to rule out acute fracture dislocation.  In 

discussing oral pain medications with patient, she states that tramadol makes 

her break out in a rash.  Patient will be provided with hydrocodone 

5/acetaminophen 325.





Plain films do not reveal acute bony injury.  Patient has crutches and a walker 

at home, which she feels comfortable using to ambulate until she can be 

evaluated by orthopedic specialists.  Pain was improved with hydrocodone, so 

eprescription sent to patient's pharmacy.  Return precautions provided.  Patient

understands and is agreeable to discharge plan.





Dragon Disclaimer:


Dragon Disclaimer:


This electronic medical record was generated, in whole or in part, using a voice

recognition dictation system.





Departure


Departure


Impression:  


   Primary Impression:  


   Contusion of right knee, initial encounter


Disposition:  01 HOME / SELF CARE / HOMELESS


Condition:  STABLE


Referrals:  


UNKNOWN PCP NAME (PCP)








SUN SCHAFFER Jr. DO


Patient Instructions:  Knee Pain, Easy-to-Read, RICE - Routine Care for 

Injuries, Easy-to-Read





Additional Instructions:  


EMERGENCY DEPARTMENT GENERAL DISCHARGE INSTRUCTIONS





Thank you for coming to [facility] Emergency Department (ED) 

today and trusting us with you care.  We trust that you had a positive 

experience in our Emergency Department.  If you wish to speak to the department 

management, you may call the director at [phone].





YOUR FOLLOW UP INSTRUCTIONS ARE AS FOLLOWS:


1.  Follow up with your primary care doctor. If you do not have a primary 

doctor, please ask for a resource list of physicians or clinics that may be able

to assist you with follow up care.


2.  The emergency provider has interpreted your imaging studies, if any were 

ordered.  The radiology imaging specialist also reviewed them.  If there is a 

change in the findings, you will be notified in 48 hours when at all possible.


3.  If a lab test or culture has been done, your results will be reviewed and 

you will be notified if you need a change in treatment.


4.  Follow instructions verbalized to you and refer to the printouts if needed.





ADDITIONAL INSTRUCTIONS AND INFORMATION:


1.  Your care today has been supervised by a physician who is specially trained 

in emergency care.  Many problems require more than one evaluation for a 

complete diagnosis and treatment.  We recommend that you schedule your follow up

appointment as recommended to ensure complete treatment of you illness or 

injury.  If you are unable to obtain follow up care and continue to have a 

problem, or if your condition worsens, we recommend that you return to the ED.


2.  We are not able to safely determine your condition over the phone nor are we

able to give sound medical advice over the phone.  For these safety reasons, if 

you call for medical advice we will ask you to come to the ED for further 

evaluation.


3.  If you have any questions regarding these discharge instructions please call

the ED at [phone].





SAFETY INFORMATION:


In the interest of safety, wellness, and injury prevention; we encourage you to 

wear your seat belt, if you smoke; quite smoking, and we encourage family to use

a protective helmet for bicycling and other sporting events that present an 

increased risk for head injury.





IF YOUR SYMPTOMS WORSEN OR NEW SYMPTOMS DEVELOP, OR YOU HAVE CONCERNS ABOUT YOUR

CONDITION; OR IF YOUR CONDITION WORSENS WHILE YOU ARE WAITING FOR YOUR FOLLOW UP

APPOINTMENT; EITHER CONTACT YOUR PRIMARY CARE DOCTOR, THE PHYSICIAN WHOSE NAME 

AND NUMBER YOU WERE GIVEN, OR RETURN TO THE ED IMMEDIATELY.


Scripts


[Crutches]   No Conflict Check


PAIR [facility] PRN for PAIN, #1


   Prov: JOSE MARIA GARCIA         3/9/22 


Hydrocodone Bit/Acetaminophen (HYDROCODONE-APAP 5-325  **) 1 Tab Tablet


1 TAB PO PRN Q6HRS PRN for PAIN, #12 TAB 0 Refills


   Prov: JOSE MARIA GARCIA         3/9/22











JOSE MARIA GARCIA               Mar 9, 2022 20:17

## 2022-03-09 NOTE — RAD
EXAM: AP, lateral and oblique views of the right knee



DATE: 3/9/2022 8:32 PM



INDICATION: Reason: fall on ice today / Spl. Instructions:  / History:  



COMPARISON: No Prior



FINDINGS:

No acute fracture or dislocation. No joint effusion.  Joint spaces are preserved without significant 
degenerative/proliferative change.



IMPRESSION:

No acute fracture or dislocation.



Electronically signed by: Ethan Reddy MD (3/9/2022 8:38 PM) [facility]